# Patient Record
Sex: FEMALE | Race: BLACK OR AFRICAN AMERICAN | NOT HISPANIC OR LATINO | URBAN - METROPOLITAN AREA
[De-identification: names, ages, dates, MRNs, and addresses within clinical notes are randomized per-mention and may not be internally consistent; named-entity substitution may affect disease eponyms.]

---

## 2022-03-24 ENCOUNTER — OFFICE (OUTPATIENT)
Dept: URBAN - METROPOLITAN AREA CLINIC 10 | Facility: CLINIC | Age: 77
End: 2022-03-24

## 2022-03-24 VITALS
HEART RATE: 77 BPM | WEIGHT: 151 LBS | OXYGEN SATURATION: 92 % | DIASTOLIC BLOOD PRESSURE: 63 MMHG | HEIGHT: 65 IN | SYSTOLIC BLOOD PRESSURE: 113 MMHG

## 2022-03-24 DIAGNOSIS — I50.9 HEART FAILURE, UNSPECIFIED: ICD-10-CM

## 2022-03-24 DIAGNOSIS — J44.9 CHRONIC OBSTRUCTIVE PULMONARY DISEASE, UNSPECIFIED: ICD-10-CM

## 2022-03-24 DIAGNOSIS — D52.8 OTHER FOLATE DEFICIENCY ANEMIAS: ICD-10-CM

## 2022-03-24 DIAGNOSIS — K57.30 DIVERTICULOSIS OF LARGE INTESTINE WITHOUT PERFORATION OR ABS: ICD-10-CM

## 2022-03-24 PROCEDURE — 99204 OFFICE O/P NEW MOD 45 MIN: CPT | Performed by: INTERNAL MEDICINE

## 2022-03-24 NOTE — SERVICEHPINOTES
Pt is here with  mixed anemia.Lst colonoscopy was negative for neoplasia in 2010. Pt has CHF treated. Improving peroipheral edemas. Pt has  COPD, uses home oxygen. Scheduled for Cardiac work up with [facility].  The patient presents for evaluation of   anemia  .   Hemoccult stools were   negative  .   The lab values were as follows:   Hemoglobin =   9.7   g/dl   , MCV =   93  , MCH =   , serum iron =   38   µg/dl   , TIBC =   227   µg/dl   , Ferritin =    ng/ml  , Transferritin saturation =   17   %   .   Current symptoms include   . Borderline low Folate  noted ( 5.9 )  Pt is receiving oral supplement. Pt smokes 3-4 cigarettes a day.

## 2022-03-24 NOTE — SERVICENOTES
Will discuss screening colonoscopy after full cardiac work up.  Call office if significant LLQ abdominal pain present.

## 2022-09-27 ENCOUNTER — OFFICE (OUTPATIENT)
Dept: URBAN - METROPOLITAN AREA CLINIC 10 | Facility: CLINIC | Age: 77
End: 2022-09-27